# Patient Record
(demographics unavailable — no encounter records)

---

## 2024-12-03 NOTE — DISCUSSION/SUMMARY
[FreeTextEntry1] : 53M h/o HLD admitted to [facility] on 4/3/23 with acute CVA s/p IV thrombolysis at [facility], workup initially with TTE of possible small PFO, however DEVEN did not find any evidence of intracardiac shunt, MRI found with R-thalamic and L-frontotemporal strokes, did not have ILR and was planning for outpatient MCOT, presents for outpatient cardiology evaluation seen on 4/2023 underwent 27 days MCOT without Afib/flutter, seen by rheumatology for +ANA serologic workup thus far negative including repeat SEMAJ except with ESR 46 and CRP 11, last seen 5/2023, interval restarted CPAP for severe CAILIN, last seen 4/2024 with no cardiac complaints, presents for routine cardiac follow up.  No cardiac complains, unclear etiology of embolic cryptogenic stroke, given bilateral infarcts suspect embolic event, although no event on ILR but with CAILIN at risk for pAfib. Discussed cardiac benefits of using CPAP for CAILIN; patient willing to retry again.   1. Cryptogenic stroke -continue ILR monitoring- no events on November report. -continue ASA 81mg and high dose statin, LDL goal <70. Labs monitored by PCP.  -hypercoagulable workup thus far negative  - Encouraged routine exercise and healthy diet.   2. HLD -continue statin, reports recent checked by PCP  3. Elevated ESR/CRP -unclear systemic inflammation from recent CVA? -need repeat ESRCRP and check Lp(a) level ordered last visit. Patient believes he may have done this at [employer]. He will call [employer] and request results. If not done, he will have labs drawn in the near future.   Follow up with Dr. Mehta in 6 months, sooner if needed.  Patient was advised to contact the office for any new or concerning symptoms. Patient verbalized understanding and is in agreement with the above plan.  Candy Chamberlain was present in office at the time of visit. [EKG obtained to assist in diagnosis and management of assessed problem(s)] : EKG obtained to assist in diagnosis and management of assessed problem(s)

## 2024-12-03 NOTE — CARDIOLOGY SUMMARY
[de-identified] : 4/11/23- Sinus 63, normal axis, no ST changes, QTc 381 4/4/24- Sinus 63, normal axis, no ST changes, QTc 384 12/3/2024: NSR, no ST changes, QTc 391 [de-identified] : 4/4/23- Summary:\par   1. The left atrium is normal in size.\par   2. Small patent foramen ovale, with predominantly left to right shunting \par  across the atrial septum.\par   3. Left ventricular ejection fraction, by visual estimation, is 60 to \par  65%.\par   4. Grade II diastolic dysfunction.\par   5. The right atrium is normal in size.\par   6. Normal right ventricular size and function.\par   7. There is no evidence of pericardial effusion.\par   8. Repeat study with saline bubble study to confirm the PFO. or \par  recommend DEVEN if clinically indicated.\par  \par  4/6/23 - DEVEN Summary:\par   1. Color flow doppler and intravenous injection of agitated saline \par  demonstrates the presence of an intact intra atrial septum. Intact \par  intra-atrial septum without shunt.\par   2. No left atrial appendage thrombus and normal left atrial appendage \par  velocities.\par   3. No evidence of cardiac masses thrombus or valvular vegetations.\par   4. Left ventricular ejection fraction, by visual estimation, is 60 to \par  65%.\par   5. Normal global left ventricular systolic function.\par   6. The left atrium is normal in size.\par   7. Trace mitral valve regurgitation.\par   8. Sclerotic aortic valve with normal opening.\par   9. There is no evidence of pericardial effusion.\par  10. There are no prior studies on this patient for comparison purposes.

## 2024-12-03 NOTE — HISTORY OF PRESENT ILLNESS
[FreeTextEntry1] : 53M h/o HLD admitted to [facility] on 4/3/23 with acute CVA s/p IV thrombolysis at [facility], workup initially with TTE of possible small PFO, however DEVEN did not find any evidence of intracardiac shunt, MRI found with R-thalamic and L-frontotemporal strokes, did not have ILR and was planning for outpatient MCOT, presents for outpatient cardiology evaluation seen on 4/2023 underwent 27 days MCOT without Afib/flutter, seen by rheumatology for +ANA serologic workup thus far negative including repeat SEMAJ except with ESR 46 and CRP 11, last seen 5/2023, interval restarted CPAP for severe CAILIN, last seen 4/2024 with no cardiac complaints, presents for routine cardiac follow up.  Reports feeling well. Denies chest pain, SOB at rest, SILVA, palpitations, lightheadedness, dizziness, fatigue, syncope, near syncope and LE edema. Denies smoking, excessive alcohol and illicit drug use. He has not been exercising consistently due to sciatica but plans to restart soon. Admits to inconsistent CPAP use, he is willing to try it again.   Prior visit 4/4/2024: Reports feeling well, no cardiac complains, exercise without discomfort, has not been using CPAP consistently as not tolerating well, reports no difference in his sleep using or not. Planning travel to [place] then [facility]/[place] for 3 weeks.  Monthly ILR remains in sinus   Prior visit 5/2023:  Reports feeling well, denies any palpitations, wearing Apple watch for few years but only intermittently using it. Reports had repeat lipid done with PCP recently since LDL reduced to 70 on statin.    Prior visit 4/2023:  Reports feeling well, interval resolution of all neurological deficits, denies any chest discomfort or palpitations, denies any prior blood clot disorder in his family. Pending rheumatology eval for the +SEMAJ level.    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

## 2025-06-19 NOTE — CARDIOLOGY SUMMARY
[de-identified] : 4/11/23- Sinus 63, normal axis, no ST changes, QTc 381 4/4/24- Sinus 63, normal axis, no ST changes, QTc 384 12/3/2024: NSR, no ST changes, QTc 391 6/19/25: Sinus 75, normal axis, no ST abnormality, QTc 406 [de-identified] : 4/4/23- Summary:\par   1. The left atrium is normal in size.\par   2. Small patent foramen ovale, with predominantly left to right shunting \par  across the atrial septum.\par   3. Left ventricular ejection fraction, by visual estimation, is 60 to \par  65%.\par   4. Grade II diastolic dysfunction.\par   5. The right atrium is normal in size.\par   6. Normal right ventricular size and function.\par   7. There is no evidence of pericardial effusion.\par   8. Repeat study with saline bubble study to confirm the PFO. or \par  recommend DEVEN if clinically indicated.\par  \par  4/6/23 - DEVEN Summary:\par   1. Color flow doppler and intravenous injection of agitated saline \par  demonstrates the presence of an intact intra atrial septum. Intact \par  intra-atrial septum without shunt.\par   2. No left atrial appendage thrombus and normal left atrial appendage \par  velocities.\par   3. No evidence of cardiac masses thrombus or valvular vegetations.\par   4. Left ventricular ejection fraction, by visual estimation, is 60 to \par  65%.\par   5. Normal global left ventricular systolic function.\par   6. The left atrium is normal in size.\par   7. Trace mitral valve regurgitation.\par   8. Sclerotic aortic valve with normal opening.\par   9. There is no evidence of pericardial effusion.\par  10. There are no prior studies on this patient for comparison purposes.

## 2025-06-19 NOTE — DISCUSSION/SUMMARY
[FreeTextEntry1] : 54M h/o HLD admitted to [facility] on 4/3/23 with acute CVA s/p IV thrombolysis at [facility], workup initially with TTE of possible small PFO, however DEVEN did not find any evidence of intracardiac shunt, MRI found with R-thalamic and L-frontotemporal strokes, did not have ILR and was planning for outpatient MCOT, presents for outpatient cardiology evaluation seen on 4/2023 underwent 27 days MCOT without Afib/flutter, seen by rheumatology for +ANA serologic workup thus far negative including repeat SEMAJ except with ESR 46 and CRP 11, last seen 5/2023, interval restarted CPAP for severe CAILIN, last seen 12/2024, presents for follow up.  No cardiac complains, unclear etiology of embolic cryptogenic stroke, given bilateral infarcts suspect embolic event, although no event on ILR but with CAILIN at risk for pAfib to continue ILR monitoring. Discussed cardiac benefits of using CPAP for CAILIN patient willing to retry again, if still intolerant can consider future Inspire device implant. Need repeat ESR/CRP and check Lp(a) level, will also obtain CT-coronary calcium score to screen for CAD, repeat EKG no ischemic abnormality.   1. Cryptogenic stroke -continue ILR monitoring- no events since device implant -continue ASA 81mg and high dose statin, LDL goal <70 not at goal recently and A1c 6% advised better dieting. -hypercoagulable workup thus far negative  -outine exercise and healthy diet.   2. HLD -continue statin    Follow up 6 months,    [EKG obtained to assist in diagnosis and management of assessed problem(s)] : EKG obtained to assist in diagnosis and management of assessed problem(s)

## 2025-06-19 NOTE — HISTORY OF PRESENT ILLNESS
[FreeTextEntry1] : 54M h/o HLD admitted to [facility] on 4/3/23 with acute CVA s/p IV thrombolysis at [facility], workup initially with TTE of possible small PFO, however DEVEN did not find any evidence of intracardiac shunt, MRI found with R-thalamic and L-frontotemporal strokes, did not have ILR and was planning for outpatient MCOT, presents for outpatient cardiology evaluation seen on 4/2023 underwent 27 days MCOT without Afib/flutter, seen by rheumatology for +ANA serologic workup thus far negative including repeat SEMAJ except with ESR 46 and CRP 11, last seen 5/2023, interval restarted CPAP for severe CAILIN, last seen 12/2024, presents for follow up.  Reports feeling well, exercise with jogging and running limited by knee discomfort, intolerant to CPAP use been to oral appliance evaluation but did not obtain.  Recent lab A1c 6% and LDL 85 Monthly ILR no event    Prior visit 12/2024:  Reports feeling well. Denies chest pain, SOB at rest, SILVA, palpitations, lightheadedness, dizziness, fatigue, syncope, near syncope and LE edema. Denies smoking, excessive alcohol and illicit drug use. He has not been exercising consistently due to sciatica but plans to restart soon. Admits to inconsistent CPAP use, he is willing to try it again.   Prior visit 4/4/2024: Reports feeling well, no cardiac complains, exercise without discomfort, has not been using CPAP consistently as not tolerating well, reports no difference in his sleep using or not. Planning travel to [place] then [facility]/[place] for 3 weeks.  Monthly ILR remains in sinus   Prior visit 5/2023:  Reports feeling well, denies any palpitations, wearing Apple watch for few years but only intermittently using it. Reports had repeat lipid done with PCP recently since LDL reduced to 70 on statin.    Prior visit 4/2023:  Reports feeling well, interval resolution of all neurological deficits, denies any chest discomfort or palpitations, denies any prior blood clot disorder in his family. Pending rheumatology eval for the +SEMAJ level.    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

## 2025-06-19 NOTE — CARDIOLOGY SUMMARY
[de-identified] : 4/11/23- Sinus 63, normal axis, no ST changes, QTc 381 4/4/24- Sinus 63, normal axis, no ST changes, QTc 384 12/3/2024: NSR, no ST changes, QTc 391 6/19/25: Sinus 75, normal axis, no ST abnormality, QTc 406 [de-identified] : 4/4/23- Summary:\par   1. The left atrium is normal in size.\par   2. Small patent foramen ovale, with predominantly left to right shunting \par  across the atrial septum.\par   3. Left ventricular ejection fraction, by visual estimation, is 60 to \par  65%.\par   4. Grade II diastolic dysfunction.\par   5. The right atrium is normal in size.\par   6. Normal right ventricular size and function.\par   7. There is no evidence of pericardial effusion.\par   8. Repeat study with saline bubble study to confirm the PFO. or \par  recommend DEVEN if clinically indicated.\par  \par  4/6/23 - DEVEN Summary:\par   1. Color flow doppler and intravenous injection of agitated saline \par  demonstrates the presence of an intact intra atrial septum. Intact \par  intra-atrial septum without shunt.\par   2. No left atrial appendage thrombus and normal left atrial appendage \par  velocities.\par   3. No evidence of cardiac masses thrombus or valvular vegetations.\par   4. Left ventricular ejection fraction, by visual estimation, is 60 to \par  65%.\par   5. Normal global left ventricular systolic function.\par   6. The left atrium is normal in size.\par   7. Trace mitral valve regurgitation.\par   8. Sclerotic aortic valve with normal opening.\par   9. There is no evidence of pericardial effusion.\par  10. There are no prior studies on this patient for comparison purposes.

## 2025-06-19 NOTE — HISTORY OF PRESENT ILLNESS
[FreeTextEntry1] : 54M h/o HLD admitted to [facility] on 4/3/23 with acute CVA s/p IV thrombolysis at [facility], workup initially with TTE of possible small PFO, however DEVEN did not find any evidence of intracardiac shunt, MRI found with R-thalamic and L-frontotemporal strokes, did not have ILR and was planning for outpatient MCOT, presents for outpatient cardiology evaluation seen on 4/2023 underwent 27 days MCOT without Afib/flutter, seen by rheumatology for +ANA serologic workup thus far negative including repeat SEMAJ except with ESR 46 and CRP 11, last seen 5/2023, interval restarted CPAP for severe CAILIN, last seen 12/2024, presents for follow up.  Reports feeling well, exercise with jogging and running limited by knee discomfort, intolerant to CPAP use been to oral appliance evaluation but did not obtain.  Recent lab A1c 6% and LDL 85 Monthly ILR no event    Prior visit 12/2024:  Reports feeling well. Denies chest pain, SOB at rest, SILVA, palpitations, lightheadedness, dizziness, fatigue, syncope, near syncope and LE edema. Denies smoking, excessive alcohol and illicit drug use. He has not been exercising consistently due to sciatica but plans to restart soon. Admits to inconsistent CPAP use, he is willing to try it again.   Prior visit 4/4/2024: Reports feeling well, no cardiac complains, exercise without discomfort, has not been using CPAP consistently as not tolerating well, reports no difference in his sleep using or not. Planning travel to [place] then [facility]/[place] for 3 weeks.  Monthly ILR remains in sinus   Prior visit 5/2023:  Reports feeling well, denies any palpitations, wearing Apple watch for few years but only intermittently using it. Reports had repeat lipid done with PCP recently since LDL reduced to 70 on statin.    Prior visit 4/2023:  Reports feeling well, interval resolution of all neurological deficits, denies any chest discomfort or palpitations, denies any prior blood clot disorder in his family. Pending rheumatology eval for the +SEMAJ level.    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

## 2025-06-19 NOTE — DISCUSSION/SUMMARY
[FreeTextEntry1] : 54M h/o HLD admitted to [facility] on 4/3/23 with acute CVA s/p IV thrombolysis at [facility], workup initially with TTE of possible small PFO, however DEVEN did not find any evidence of intracardiac shunt, MRI found with R-thalamic and L-frontotemporal strokes, did not have ILR and was planning for outpatient MCOT, presents for outpatient cardiology evaluation seen on 4/2023 underwent 27 days MCOT without Afib/flutter, seen by rheumatology for +ANA serologic workup thus far negative including repeat SEMAJ except with ESR 46 and CRP 11, last seen 5/2023, interval restarted CPAP for severe CAILIN, last seen 12/2024, presents for follow up.  No cardiac complains, unclear etiology of embolic cryptogenic stroke, given bilateral infarcts suspect embolic event, although no event on ILR but with CAILIN at risk for pAfib to continue ILR monitoring. Discussed cardiac benefits of using CPAP for CAILIN patient willing to retry again, if still intolerant can consider future Inspire device implant. Need repeat ESR/CRP and check Lp(a) level, will also obtain CT-coronary calcium score to screen for CAD, repeat EKG no ischemic abnormality.   1. Cryptogenic stroke -continue ILR monitoring- no events since device implant -continue ASA 81mg and high dose statin, LDL goal <70 not at goal recently and A1c 6% advised better dieting. -hypercoagulable workup thus far negative  -outine exercise and healthy diet.   2. HLD -continue statin    Follow up 6 months,    [EKG obtained to assist in diagnosis and management of assessed problem(s)] : EKG obtained to assist in diagnosis and management of assessed problem(s)

## 2025-07-21 NOTE — CONSULT LETTER
[Dear  ___] : Dear  [unfilled], [Consult Letter:] : I had the pleasure of evaluating your patient, [unfilled]. [Please see my note below.] : Please see my note below. [Consult Closing:] : Thank you very much for allowing me to participate in the care of this patient.  If you have any questions, please do not hesitate to contact me. [Sincerely,] : Sincerely, [DrJavier  ___] : Dr. SILVESTRE [FreeTextEntry2] :  Dr. Victoriano Gay [FreeTextEntry3] : Richard B. Libman, MD, FRCPC \par  , Neurology \par  Co-Director, Stroke Center\par  Professor of Neurology\par  [facility] [employer] at [facility]\par

## 2025-07-21 NOTE — PHYSICAL EXAM
[General Appearance - Alert] : alert [General Appearance - In No Acute Distress] : in no acute distress [Oriented To Time, Place, And Person] : oriented to person, place, and time [Impaired Insight] : insight and judgment were intact [Affect] : the affect was normal [Sclera] : the sclera and conjunctiva were normal [Extraocular Movements] : extraocular movements were intact [Full Visual Field] : full visual field [Outer Ear] : the ears and nose were normal in appearance [Examination Of The Oral Cavity] : the lips and gums were normal [Neck Appearance] : the appearance of the neck was normal [Neck Cervical Mass (___cm)] : no neck mass was observed [Auscultation Breath Sounds / Voice Sounds] : lungs were clear to auscultation bilaterally [Heart Rate And Rhythm] : heart rate was normal and rhythm regular [Heart Sounds] : normal S1 and S2 [Heart Sounds Gallop] : no gallops [Murmurs] : no murmurs [Heart Sounds Pericardial Friction Rub] : no pericardial rub [Arterial Pulses Carotid] : carotid pulses were normal with no bruits [Edema] : there was no peripheral edema [Veins - Varicosity Changes] : there were no varicosital changes [No CVA Tenderness] : no ~M costovertebral angle tenderness [No Spinal Tenderness] : no spinal tenderness [Abnormal Walk] : normal gait [Nail Clubbing] : no clubbing  or cyanosis of the fingernails [Musculoskeletal - Swelling] : no joint swelling seen [Motor Tone] : muscle strength and tone were normal [Skin Color & Pigmentation] : normal skin color and pigmentation [Skin Turgor] : normal skin turgor [] : no rash [FreeTextEntry1] : Generally looked well. Mental status exam: Alert, oriented x3, speech fluent/prosodic without paraphasias; comprehension intact; memory and fund of knowledge intact. On cranial nerve exam,  cranial nerves II through XII was intact. On motor exam tone was normal. There was no drift. Fine finger movements are intact. Power was normal throughout. Reflexes were 2+ in the biceps and triceps, 2-3 at the brachioradialis, 3+ at the knees , 2+ at the right achilles and absent on the left, and plantar reflexes were silent. Coordination of the arms and gait were normal. Tandem gait was normal. Romberg test was negative. Sensory exam was intact to all modalities. \par  \par  \par

## 2025-07-21 NOTE — HISTORY OF PRESENT ILLNESS
[FreeTextEntry1] : He is a 54 year old right handed gentleman.  HPI from [facility] 4/3/23: He presented to [facility] with acute onset left hemiparesis on 4/3/23. His wife found him slumped to the left side complaining of vertical diplopia and slurring. . NIH 4 @ [facility] Tenecteplase given, Transferred to [facility] for neuromonitoring s/p tenecteplase. NIH 0 @ [facility] arrival.   Workup at [facility] includes: MR Head w/wo IV Cont (4/5/23) To my eye, there is an acute right anterior thalamic infarct (tuberothalamic). There are 2 punctate nonspecific  hyperintensities in the left hemispheric white matter CT angiogram Head/Neck 4/3/23: To my eye, unremarkable.  DEVEN  (4/6/23): EF 60-65%, no PFO.   4/28/23. He presents to the office today. While at [facility], he had some fluctuation in his deficits. He states that since hospitalization, he has been experiencing daytime fatigue, but otherwise denies any focal neurologic symptoms. He has an external cardiac monitor, which has thus far been negative.  8/14/23. He presents to the office today. One month ago, he experienced numbness on the right side of his head, lasting about a week; not present anywhere else in the body,   Carotid Doppler 8/14/23: Normal. Transcranial Doppler 8/14/23: Normal.  7/18/24 He presents to the office today. He denies any new focal neurologic symptoms.  Carotid Doppler 7/17/24: No significant stenosis in the right ICA. Mild, <40% stenosis in the left ICA. Incidental finding: hypoechoic nodule imaged in the left lobe of the thyroid gland measuring 0.9 x 0.5cm. Impression: Upon retrospective review, the hypoechoic nodule was present on previous exam. There is no significant change from previous exam in 8/23. Transcranial Doppler 7/17/24: Normal.  7/21/2025. He presents to the office today. He denies any new focal neurologic symptoms.  Carotid Doppler 7/21/25: No significant stenosis in the right ICA. Mild, <40% stenosis in the left ICA. Impression: No significant change from 07/2024, except a thyroid nodule was not detected on current exam. TCD 7/21/25: Normal.  PCP Dr. Victoriano Gay

## 2025-07-21 NOTE — CONSULT LETTER
[Dear  ___] : Dear  [unfilled], [Consult Letter:] : I had the pleasure of evaluating your patient, [unfilled]. [Please see my note below.] : Please see my note below. [Consult Closing:] : Thank you very much for allowing me to participate in the care of this patient.  If you have any questions, please do not hesitate to contact me. [Sincerely,] : Sincerely, [DrJavier  ___] : Dr. SILVESTRE [FreeTextEntry2] :  Dr. Victoriano Gay [FreeTextEntry3] : Richard B. Libman, MD, FRCPC \par  , Neurology \par  Co-Director, Stroke Center\par  Professor of Neurology\par  [facility] at [facility]\par

## 2025-07-21 NOTE — REVIEW OF SYSTEMS
[As Noted in HPI] : as noted in HPI [Negative] : Heme/Lymph [de-identified] : sciatic pain when sitting for prolonged periods. [FreeTextEntry2] : sleep apnea

## 2025-07-21 NOTE — DISCUSSION/SUMMARY
[FreeTextEntry1] : 4/28/23 He has an absent achilles reflex on the left, perhaps due to a peripheral nerve entrapment or lumbosacral radiculopathy. He is otherwise neurologically intact.  To summarize, he presented to [facility] ED on 4/3/23 with left hemiparesis and vertical diplopia perhaps due to a skew deviation) due to a right thalamic infarct, most likely due to small vessel disease. He was treated with tenecteplase and was transferred to [facility] where his deficits fluctuated a couple of times, before resolving.  He has an external cardiac monitor, with plans for 6 weeks of monitoring. If there is no evidence of AF on monitoring, there is no role for further cardiac monitoring with a loop recorder.   He should benefit from aggressive vascular risk factor control: Continue on Aspirin 81mg daily, continue on statin with goal LDL <70, maintain blood pressure in normotension range, eat healthfully, and exercise as tolerated. He snores at night, raising concern for obstructive sleep apnea. He has an appointment to be evaluated by a sleep specialist.  His fluctuations in the hospital raised the possibility of seizures, although there was no evidence of this.  He was started on Levetiracetam. His video EEG which was negative. I have recommended he taper off the Levetiracetam: 500 mg once a day for 3 days, then stop.  8/14/23 - Overall he is neurologically stable and intact. - One month ago, he experienced right sided posterior head numbness/tingling. I doubt this represents a recurrent stroke, and don't think he would benefit from further neurovascular workup. Perhaps, this represents a C2 radiculopathy, but this is also doubtful. For now, I offered reassurance.  - Carotid and transcranial Dopplers done today were normal. - He should continue to benefit from aggressive vascular risk factor control.  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  If it worsens, he may benefit from a neuromuscular evaluation. - He is scheduled for an in-lab sleep study tonight.  7/17/24 - Overall he is neurologically stable and doing very well. - Dopplers done on 7/16/24 were stable from previous exam and unremarkable, with mild left ICA stenosis. A hypoechoic thyroid nodule was incidentally detected; I will defer to your judgement regarding whether this finding warrants further investigation. - He should continue to benefit from aggressive vascular risk factor control. In terms of lipids, target LDL should be less than 55. - Following an in lab sleep study in 08/23, he was diagnosed with a severe degree of obstructive sleep apnea. He has only been using the  CPAP 30% of the time,  due to poor tolerability.   I recommend he have a dental consultation for a dental appliance.  7/21/2025. - Overall he is neurologically stable and doing very well. - Doppler studies done today were stable and unremarkable with mild left ICA stenosis. - He should continue to benefit from aggressive vascular risk factor control. In terms of lipids, target LDL should be less than 55. - He is using CPAP for sleep apnea, with poor tolerability. I recommend he consult with a sleep specialist to address other treatment options; referral provided.  He can follow up in 1 year with repeat Dopplers. I hope he remains free of further serious trouble.

## 2025-07-21 NOTE — REVIEW OF SYSTEMS
[As Noted in HPI] : as noted in HPI [Negative] : Heme/Lymph [de-identified] : sciatic pain when sitting for prolonged periods. [FreeTextEntry2] : sleep apnea

## 2025-07-21 NOTE — DISCUSSION/SUMMARY
[FreeTextEntry1] : 4/28/23 He has an absent achilles reflex on the left, perhaps due to a peripheral nerve entrapment or lumbosacral radiculopathy. He is otherwise neurologically intact.  To summarize, he presented to [place] ED on 4/3/23 with left hemiparesis and vertical diplopia perhaps due to a skew deviation) due to a right thalamic infarct, most likely due to small vessel disease. He was treated with tenecteplase and was transferred to [facility] where his deficits fluctuated a couple of times, before resolving.  He has an external cardiac monitor, with plans for 6 weeks of monitoring. If there is no evidence of AF on monitoring, there is no role for further cardiac monitoring with a loop recorder.   He should benefit from aggressive vascular risk factor control: Continue on Aspirin 81mg daily, continue on statin with goal LDL <70, maintain blood pressure in normotension range, eat healthfully, and exercise as tolerated. He snores at night, raising concern for obstructive sleep apnea. He has an appointment to be evaluated by a sleep specialist.  His fluctuations in the hospital raised the possibility of seizures, although there was no evidence of this.  He was started on Levetiracetam. His video EEG which was negative. I have recommended he taper off the Levetiracetam: 500 mg once a day for 3 days, then stop.  8/14/23 - Overall he is neurologically stable and intact. - One month ago, he experienced right sided posterior head numbness/tingling. I doubt this represents a recurrent stroke, and don't think he would benefit from further neurovascular workup. Perhaps, this represents a C2 radiculopathy, but this is also doubtful. For now, I offered reassurance.  - Carotid and transcranial Dopplers done today were normal. - He should continue to benefit from aggressive vascular risk factor control.  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  If it worsens, he may benefit from a neuromuscular evaluation. - He is scheduled for an in-lab sleep study tonight.  7/17/24 - Overall he is neurologically stable and doing very well. - Dopplers done on 7/16/24 were stable from previous exam and unremarkable, with mild left ICA stenosis. A hypoechoic thyroid nodule was incidentally detected; I will defer to your judgement regarding whether this finding warrants further investigation. - He should continue to benefit from aggressive vascular risk factor control. In terms of lipids, target LDL should be less than 55. - Following an in lab sleep study in 08/23, he was diagnosed with a severe degree of obstructive sleep apnea. He has only been using the  CPAP 30% of the time,  due to poor tolerability.   I recommend he have a dental consultation for a dental appliance.  7/21/2025. - Overall he is neurologically stable and doing very well. - Doppler studies done today were stable and unremarkable with mild left ICA stenosis. - He should continue to benefit from aggressive vascular risk factor control. In terms of lipids, target LDL should be less than 55. - He is using CPAP for sleep apnea, with poor tolerability. I recommend he consult with a sleep specialist to address other treatment options; referral provided.  He can follow up in 1 year with repeat Dopplers. I hope he remains free of further serious trouble. good balance